# Patient Record
Sex: FEMALE | Race: WHITE | NOT HISPANIC OR LATINO
[De-identification: names, ages, dates, MRNs, and addresses within clinical notes are randomized per-mention and may not be internally consistent; named-entity substitution may affect disease eponyms.]

---

## 2018-03-15 PROBLEM — Z00.00 ENCOUNTER FOR PREVENTIVE HEALTH EXAMINATION: Status: ACTIVE | Noted: 2018-03-15

## 2018-03-23 ENCOUNTER — APPOINTMENT (OUTPATIENT)
Dept: ANTEPARTUM | Facility: CLINIC | Age: 38
End: 2018-03-23
Payer: COMMERCIAL

## 2018-03-23 ENCOUNTER — ASOB RESULT (OUTPATIENT)
Age: 38
End: 2018-03-23

## 2018-03-23 ENCOUNTER — APPOINTMENT (OUTPATIENT)
Dept: MATERNAL FETAL MEDICINE | Facility: CLINIC | Age: 38
End: 2018-03-23
Payer: COMMERCIAL

## 2018-03-23 PROCEDURE — 76801 OB US < 14 WKS SINGLE FETUS: CPT

## 2018-03-23 PROCEDURE — 36416 COLLJ CAPILLARY BLOOD SPEC: CPT

## 2018-03-23 PROCEDURE — 76813 OB US NUCHAL MEAS 1 GEST: CPT

## 2018-03-23 PROCEDURE — 99241 OFFICE CONSULTATION NEW/ESTAB PATIENT 15 MIN: CPT

## 2018-03-26 LAB
1ST TRIMESTER DATA: NORMAL
ADDENDUM DOC: NORMAL
AFP PNL SERPL: NORMAL
AFP SERPL-ACNC: NORMAL
CLINICAL BIOCHEMIST REVIEW: NORMAL
FREE BETA HCG 1ST TRIMESTER: NORMAL
Lab: NORMAL
NOTES NTD: NORMAL
NT: NORMAL
PAPP-A SERPL-ACNC: NORMAL
TRISOMY 18/3: NORMAL

## 2018-03-27 ENCOUNTER — TRANSCRIPTION ENCOUNTER (OUTPATIENT)
Age: 38
End: 2018-03-27

## 2018-05-14 ENCOUNTER — APPOINTMENT (OUTPATIENT)
Dept: ANTEPARTUM | Facility: CLINIC | Age: 38
End: 2018-05-14
Payer: COMMERCIAL

## 2018-05-14 ENCOUNTER — ASOB RESULT (OUTPATIENT)
Age: 38
End: 2018-05-14

## 2018-05-14 PROCEDURE — 76811 OB US DETAILED SNGL FETUS: CPT

## 2018-05-14 PROCEDURE — 76817 TRANSVAGINAL US OBSTETRIC: CPT

## 2018-07-11 ENCOUNTER — APPOINTMENT (OUTPATIENT)
Dept: ANTEPARTUM | Facility: CLINIC | Age: 38
End: 2018-07-11
Payer: COMMERCIAL

## 2018-07-11 ENCOUNTER — ASOB RESULT (OUTPATIENT)
Age: 38
End: 2018-07-11

## 2018-07-11 PROCEDURE — 76816 OB US FOLLOW-UP PER FETUS: CPT

## 2018-07-11 PROCEDURE — 76817 TRANSVAGINAL US OBSTETRIC: CPT

## 2018-08-22 ENCOUNTER — APPOINTMENT (OUTPATIENT)
Dept: ANTEPARTUM | Facility: CLINIC | Age: 38
End: 2018-08-22

## 2018-08-22 ENCOUNTER — APPOINTMENT (OUTPATIENT)
Dept: ANTEPARTUM | Facility: CLINIC | Age: 38
End: 2018-08-22
Payer: COMMERCIAL

## 2018-08-22 ENCOUNTER — ASOB RESULT (OUTPATIENT)
Age: 38
End: 2018-08-22

## 2018-08-22 PROCEDURE — 76816 OB US FOLLOW-UP PER FETUS: CPT

## 2018-08-22 PROCEDURE — 76819 FETAL BIOPHYS PROFIL W/O NST: CPT

## 2018-08-22 PROCEDURE — 76817 TRANSVAGINAL US OBSTETRIC: CPT

## 2018-12-12 ENCOUNTER — TRANSCRIPTION ENCOUNTER (OUTPATIENT)
Age: 38
End: 2018-12-12

## 2020-02-24 ENCOUNTER — APPOINTMENT (OUTPATIENT)
Dept: CARDIOLOGY | Facility: CLINIC | Age: 40
End: 2020-02-24
Payer: COMMERCIAL

## 2020-02-24 ENCOUNTER — NON-APPOINTMENT (OUTPATIENT)
Age: 40
End: 2020-02-24

## 2020-02-24 VITALS
DIASTOLIC BLOOD PRESSURE: 79 MMHG | SYSTOLIC BLOOD PRESSURE: 123 MMHG | BODY MASS INDEX: 24.99 KG/M2 | RESPIRATION RATE: 82 BRPM | HEIGHT: 65 IN | OXYGEN SATURATION: 98 % | WEIGHT: 150 LBS

## 2020-02-24 DIAGNOSIS — Z82.49 FAMILY HISTORY OF ISCHEMIC HEART DISEASE AND OTHER DISEASES OF THE CIRCULATORY SYSTEM: ICD-10-CM

## 2020-02-24 DIAGNOSIS — R93.1 ABNORMAL FINDINGS ON DIAGNOSTIC IMAGING OF HEART AND CORONARY CIRCULATION: ICD-10-CM

## 2020-02-24 DIAGNOSIS — N95.9 UNSPECIFIED MENOPAUSAL AND PERIMENOPAUSAL DISORDER: ICD-10-CM

## 2020-02-24 PROCEDURE — 99204 OFFICE O/P NEW MOD 45 MIN: CPT

## 2020-02-24 PROCEDURE — 93000 ELECTROCARDIOGRAM COMPLETE: CPT

## 2020-03-12 ENCOUNTER — TRANSCRIPTION ENCOUNTER (OUTPATIENT)
Age: 40
End: 2020-03-12

## 2021-01-20 ENCOUNTER — TRANSCRIPTION ENCOUNTER (OUTPATIENT)
Age: 41
End: 2021-01-20

## 2022-05-08 ENCOUNTER — NON-APPOINTMENT (OUTPATIENT)
Age: 42
End: 2022-05-08

## 2022-12-31 ENCOUNTER — NON-APPOINTMENT (OUTPATIENT)
Age: 42
End: 2022-12-31

## 2023-01-27 ENCOUNTER — NON-APPOINTMENT (OUTPATIENT)
Age: 43
End: 2023-01-27

## 2023-01-27 ENCOUNTER — APPOINTMENT (OUTPATIENT)
Dept: OTOLARYNGOLOGY | Facility: CLINIC | Age: 43
End: 2023-01-27
Payer: COMMERCIAL

## 2023-01-27 VITALS
SYSTOLIC BLOOD PRESSURE: 115 MMHG | DIASTOLIC BLOOD PRESSURE: 82 MMHG | BODY MASS INDEX: 24.99 KG/M2 | HEART RATE: 81 BPM | HEIGHT: 65 IN | WEIGHT: 150 LBS

## 2023-01-27 DIAGNOSIS — R07.0 PAIN IN THROAT: ICD-10-CM

## 2023-01-27 DIAGNOSIS — Z86.16 PERSONAL HISTORY OF COVID-19: ICD-10-CM

## 2023-01-27 DIAGNOSIS — H69.83 OTHER SPECIFIED DISORDERS OF EUSTACHIAN TUBE, BILATERAL: ICD-10-CM

## 2023-01-27 DIAGNOSIS — E04.9 NONTOXIC GOITER, UNSPECIFIED: ICD-10-CM

## 2023-01-27 PROCEDURE — 92570 ACOUSTIC IMMITANCE TESTING: CPT

## 2023-01-27 PROCEDURE — 31575 DIAGNOSTIC LARYNGOSCOPY: CPT

## 2023-01-27 PROCEDURE — 92557 COMPREHENSIVE HEARING TEST: CPT

## 2023-01-27 PROCEDURE — 99243 OFF/OP CNSLTJ NEW/EST LOW 30: CPT | Mod: 25

## 2023-01-27 RX ORDER — FLUTICASONE PROPIONATE 50 UG/1
50 SPRAY, METERED NASAL
Qty: 3 | Refills: 3 | Status: ACTIVE | COMMUNITY
Start: 2023-01-27 | End: 1900-01-01

## 2023-01-27 RX ORDER — FAMOTIDINE 20 MG/1
20 TABLET, FILM COATED ORAL
Qty: 60 | Refills: 5 | Status: ACTIVE | COMMUNITY
Start: 2023-01-27 | End: 1900-01-01

## 2023-01-27 RX ORDER — AZELASTINE HYDROCHLORIDE AND FLUTICASONE PROPIONATE 137; 50 UG/1; UG/1
137-50 SPRAY, METERED NASAL
Qty: 1 | Refills: 5 | Status: ACTIVE | COMMUNITY
Start: 2023-01-27 | End: 1900-01-01

## 2023-01-27 RX ORDER — AZELASTINE HYDROCHLORIDE 137 UG/1
137 SPRAY, METERED NASAL TWICE DAILY
Qty: 3 | Refills: 3 | Status: ACTIVE | COMMUNITY
Start: 2023-01-27 | End: 1900-01-01

## 2023-01-27 NOTE — REVIEW OF SYSTEMS
[Post Nasal Drip] : post nasal drip [Ear Pain] : ear pain [Ear Itch] : ear itch [Hearing Loss] : hearing loss [Dizziness] : dizziness [Vertigo] : vertigo [Lightheadedness] : lightheadedness [Ear Drainage] : ear drainage [Ear Noises] : ear noises [Nasal Congestion] : nasal congestion [Sinus Pain] : sinus pain [Sinus Pressure] : sinus pressure [Hoarseness] : hoarseness [Throat Clearing] : throat clearing [Throat Pain] : throat pain [Throat Dryness] : throat dryness [Throat Itching] : throat itching [Palpitations] : palpitations [Cough] : cough [Swollen Glands] : swollen glands [Negative] : Endocrine [FreeTextEntry1] : fatigue, headaches, difficulty swallowing, daytime sleepiness

## 2023-01-27 NOTE — HISTORY OF PRESENT ILLNESS
[de-identified] : Patient states that about a month ago she starting having throat pain described as glass in her throat. Patient states her  had Covid when her symptoms first began. Patient states she eventually tested positive for Covid. Patient states her symptoms eventually progressed to nasal congestion, mucus in her throat, and pain and pressure in her left ear. She also had strep test which was negative. She states most of her throat pain comes at night time and that she cant get enough water. She adds that her voice doesn’t sound like normally does. Patient states she has a clogged sensation in her left ear.  Patient adds that she does get indigestion and reflux. She states she has been trying to watch her coffee intake.

## 2023-01-27 NOTE — CONSULT LETTER
[Dear  ___] : Dear  [unfilled], [Consult Letter:] : I had the pleasure of evaluating your patient, [unfilled]. [Please see my note below.] : Please see my note below. [Consult Closing:] : Thank you very much for allowing me to participate in the care of this patient.  If you have any questions, please do not hesitate to contact me. [Sincerely,] : Sincerely, [FreeTextEntry1] : Mark Anthony Howard MD FACS

## 2023-01-27 NOTE — ASSESSMENT
[FreeTextEntry1] : Reviewed and reconciled medications, allergies, PMHx, PSHx, SocHx, FMHx \par \par Patient states that about a month ago she starting having throat pain described as glass in her throat. Patient states her  had Covid when her symptoms first began. Patient states she eventually tested positive for Covid. Patient states her symptoms eventually progressed to nasal congestion, mucus in her throat, and pain and pressure in her left ear. She also had strep test which was negative. She states most of her throat pain comes at night time and that she cant get enough water. She adds that her voice doesn’t sound like normally does. Patient adds that she does get indigestion and reflux. She states she has been trying to watch her coffee intake. \par \par Physical Exam:\par -Left Ear: dull\par -romberg negative - felt unsteady\par -horizontal head roll: positive. better with eyes open\par -vertical head roll: negative\par -slight deviation of septum to the left\par -tonsils are small and cryptic, class 1\par \par Flexible Laryngoscopy:\par -nasal pharynx minimally inflamed\par -BOT normal\par -mild edema of arytenoid and intra- arytenoid\par -no lesions, pooling. or growth\par \par Audio: negative pressure both ears:  - 4 on the right and -5 on the left\par -100% discrim at 45dB bilaterally.\par TYMPS:  TYPE A, AU\par ETF: ABNORMAL, AU\par HEARING -8KHZ, AU \par \par \par Plan: Flexible laryngoscopy. Reflux diet/instruction sheet provided. Audio - results interpreted by Dr. Howard and reviewed with the patient. Start Dymista - 1 spray bilaterally BID, spray laterally. VNG ordered. Thyroid blood work ordered. Sed rate and ANNIKA ordered. Start Famotidine twice a day - one in the morning before breakfast, and one at bedtime. FEEST ordered and to be done at least 2 weeks after starting the medication. FU after tests

## 2023-01-27 NOTE — PHYSICAL EXAM
[Hearing Bee Test (Tuning Fork On Forehead)] : no lateralization of tone [Midline] : trachea located in midline position [Normal] : no rashes [de-identified] : tender over the thyroid [FreeTextEntry9] : dull [FreeTextEntry1] : -slight deviation of septum to the left [de-identified] : tonsils are small and cryptic, class 1 [] : Romberg test is negative [de-identified] : felt unsteady

## 2023-01-27 NOTE — PROCEDURE
[Hoarseness] : hoarseness not clearly evaluated by indirect laryngoscopy [Complicated Symptoms] : complicated symptoms requiring more thorough examination than provided by mirror [de-identified] : Flexible Laryngoscopy:\par -nasal pharynx minimally inflamed\par -BOT normal\par -mild edema of arytenoid and intra- arytenoid\par -no lesions, pooling. or growth\par

## 2023-01-30 LAB
ERYTHROCYTE [SEDIMENTATION RATE] IN BLOOD BY WESTERGREN METHOD: 3 MM/HR
T3FREE SERPL-MCNC: 2.37 PG/ML
T4 FREE SERPL-MCNC: 1.3 NG/DL
THYROGLOB AB SERPL-ACNC: <20 IU/ML
THYROPEROXIDASE AB SERPL IA-ACNC: 12.8 IU/ML
TSH SERPL-ACNC: 0.77 UIU/ML

## 2023-01-31 ENCOUNTER — TRANSCRIPTION ENCOUNTER (OUTPATIENT)
Age: 43
End: 2023-01-31

## 2023-02-01 LAB — ANA SER IF-ACNC: NEGATIVE

## 2023-02-08 ENCOUNTER — APPOINTMENT (OUTPATIENT)
Dept: OTOLARYNGOLOGY | Facility: CLINIC | Age: 43
End: 2023-02-08
Payer: COMMERCIAL

## 2023-02-08 PROCEDURE — 92537 CALORIC VSTBLR TEST W/REC: CPT

## 2023-02-08 PROCEDURE — 92540 BASIC VESTIBULAR EVALUATION: CPT

## 2023-02-13 ENCOUNTER — APPOINTMENT (OUTPATIENT)
Dept: OTOLARYNGOLOGY | Facility: CLINIC | Age: 43
End: 2023-02-13
Payer: COMMERCIAL

## 2023-02-13 PROCEDURE — 92612 ENDOSCOPY SWALLOW (FEES) VID: CPT | Mod: GN

## 2023-02-14 NOTE — ASSESSMENT
[FreeTextEntry1] : REPORT OF EVALUATION OF SWALLOW FUNCTION VIA FLEXIBLE ENDOSCOPIC EXAMINATION OF SWALLOWING (FEES) \par \par Date of Evaluation: 23\par Patient Name: Sinai López \par : 1980\par Diagnosis: Pharyngeal Dysphagia R13.14\par Physician: Dr. Mark Anthony Howard\par Type of Assessment: FEES\par Onset: 2022\par \par History: Information on this patient has been provided by the patient and via chart review. Ms. Sinai López is a 42 year-old-female who was referred for a FEES. The purpose of the evaluation is to clinically assess the oropharyngeal swallow mechanism and vocal mechanism, secondary to concerns for excessive throat clearing and intermittent dysphagia.  \par \par Reason For Referral: Patient reports a ~2 month history of odynophagia, excessive throat clearing, dysphagia and hoarseness. She stated she had COVID-19 infection in December which preceded her symptoms. She reported a history of LUIS but has been trying to adhere to a LPRD/LUIS diet. She reported she has extensive voice demands for her job as a  and has 2 young children. 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 Pureed via teaspoon\par 2. Solids, self-fed\par 3. Thin fluids via cup\par \par FEES PROCEDURE: \par For the purposes of today’s assessment, the patient was provided with pureed, regular solid and thin liquid textures impregnated in green. The nasendoscope was advanced via the left nare and placed superiorly to the supraglottic structures, with good visibility. The supraglottic structures appeared to be WNL. The vocal folds were fully mobile bilaterally. There was edema of the postcricoid, arytenoids and interarytenoid structures. There were white, ropey secretions along the epiglottic rim prior to PO trials. \par \par Oral stages were WFL marked by adequate acceptance of bolus, lip closure and oral containment, with adequate lingual movement and cohesive bolus formation, recollection, and AP transport. Intermittent premature spillage the valleculae noted across solids. \par \par Pharyngeal stage was marked by a mild swallow trigger delay and mildly reduced base of tongue retraction and adequate pharyngeal contractibility. There was complete laryngeal vesituble closure. There was trace-mild residue viewed in valleculae and the epiglottic rim post swallow of pureed and solids, reduced in amount for thin fluids which was reduced in amount with secondary/tertiary swallows. There was no penetration or aspiration pre or post swallow for puree, solids, and thin fluids. In addition, evidence of LPR included return of green-tinged material to the level of the pharyngoesophageal segment advancing to the pyriform sinuses. At this point, the scope was carefully removed and testing was discontinued, with the patient tolerating the procedure without difficulty. \par \par Aspiration - Penetration Scale: 1 - Pureed; Regular Solids; Thin Liquid\par \par Aspiration - Penetration Scale (Bibik et al Dysphagia 11:93-98 (1996), Aspiration-Penetration Scale) \par 1. Material does not enter the airway \par 2. Material enters the airway, remains above the vocal folds, and is ejected from the airway \par 3. Material enters the airway, remains above the vocal folds, and is not ejected \par 4. Material enters the airway, contacts the vocal folds, and is ejected from the airway \par 5. Material enters the airway, contacts the vocal folds, and is not ejected from the airway \par 6. Material enters the airway, passes below the vocal folds and is ejected into the larynx or out of the airway \par 7. Material enters the airway, passes below the vocal folds, and is not ejected from the trachea despite effort \par 8. Material enters the airway, passes below the vocal folds, and no effort is made to eject \par \par IMPRESSIONS: Mild pharyngeal Dysphagia with evidence of active laryngopharyngeal reflux \par \par RECOMMENDATIONS:\par 1) Continue a Regular Solid / Thin Liquid diet\par 2) Initiate Reflux Precautions and Lifestyle / Diet Modifications\par 3) Strict adherence to vocal hygiene parameters (i.e., increase hydration, avoid vocal abuse/misuse, avoid aggressive throat clearing/coughing, avoid environmental irritants, etc.).\par 4) Follow up with referring MD as directed\par \par EDUCATION: Verbal and written educational information were provided re: reflux precautions and lifestyle / diet modifications. The patient demonstrated full understanding for all the above. Should you have additional questions/concerns, please contact this office at [phone].\par \par Maya Landis M.A., CCC-SLP\par Speech-Language Pathologist

## 2023-02-22 ENCOUNTER — APPOINTMENT (OUTPATIENT)
Dept: OTOLARYNGOLOGY | Facility: CLINIC | Age: 43
End: 2023-02-22
Payer: COMMERCIAL

## 2023-02-22 VITALS
HEART RATE: 82 BPM | WEIGHT: 150 LBS | DIASTOLIC BLOOD PRESSURE: 79 MMHG | SYSTOLIC BLOOD PRESSURE: 116 MMHG | HEIGHT: 65 IN | BODY MASS INDEX: 24.99 KG/M2

## 2023-02-22 DIAGNOSIS — J38.4 EDEMA OF LARYNX: ICD-10-CM

## 2023-02-22 DIAGNOSIS — H93.3X2 DISORDERS OF LEFT ACOUSTIC NERVE: ICD-10-CM

## 2023-02-22 DIAGNOSIS — R13.12 DYSPHAGIA, OROPHARYNGEAL PHASE: ICD-10-CM

## 2023-02-22 PROCEDURE — 99214 OFFICE O/P EST MOD 30 MIN: CPT

## 2023-02-22 RX ORDER — OMEPRAZOLE 40 MG/1
40 CAPSULE, DELAYED RELEASE ORAL
Qty: 90 | Refills: 3 | Status: ACTIVE | COMMUNITY
Start: 2023-02-22 | End: 1900-01-01

## 2023-02-22 NOTE — ADDENDUM
[FreeTextEntry1] : Documented by Agnes Bhakta acting as scribe for Dr. Howard on 02/22/2023.\par \par All Medical record entries made by the scribe were at my, Dr. Howard,direction and personally dictated by me on 02/22/2023. I have reviewed the chart and agree that the record accurately reflects my personal performance of the history, physical exam, assessment and plan. I have also personally directed, reviewed, and agreed with the discharge instructions.

## 2023-02-22 NOTE — ASSESSMENT
[FreeTextEntry1] : Reviewed and reconciled medications, allergies, PMHx, PSHx, SocHx, FMHx.\par \par Pt presents with h/o dizziness and throat pain. Pt presents today for VNG results. Pt notes she still gets dizzy occasionally. Pt notes she notices it often when she is making dinner and cutting vegetables. Pt notes she gets dizzy when she uses a computer and when she turns quick when driving. Pt notes she has been following reflux diet and taking Famotidine, but she still gets reflux symptoms. \par \par Audio 1/27/23 normal\par \par VNG 2/8/23\par normal\par 52% weakness in left ear \par \par FEEST 2/13/23\par edema postcricoid, thick secretions in the throat, trace residue in vallecula epiglottic rim - resolved with second swallow, evidence of active reflux\par \par Plan:\par Discussed VNG and FEEST. Vestibular therapy recommended. More the 50% of time was spent counseling the patient. Take smaller bites, drink as you eat, avoid laying down after eating, continue to follow reflux diet. Omeprazole QAM 30 minutes after other medications and 30 minutes before morning meal and Famotidine QPM. Continue Azelastine and Flonase. FU 3 months

## 2023-02-22 NOTE — CONSULT LETTER
[Dear  ___] : Dear  [unfilled], [Courtesy Letter:] : I had the pleasure of seeing your patient, [unfilled], in my office today. [Please see my note below.] : Please see my note below. [Consult Closing:] : Thank you very much for allowing me to participate in the care of this patient.  If you have any questions, please do not hesitate to contact me. [Sincerely,] : Sincerely, [FreeTextEntry3] : Mark Anthony Howard MD FACS

## 2023-02-22 NOTE — HISTORY OF PRESENT ILLNESS
[de-identified] : Pt presents with h/o dizziness and throat pain. Pt presents today for VNG results. Pt notes she still gets dizzy occasionally. Pt notes she notices it often when she is making dinner and cutting vegetables. Pt notes she gets dizzy when she uses a computer and when she turns quick when driving. Pt denies dizziness when laying in bed, turning over in bed, turning her head while just sitting here. pt notes she feels a difference on the left side compared to the right side (left worse than right). Pt notes she has been following the reflux diet and taking Famotidine, but she still feels burning in her stomach and mucus in throat.

## 2023-03-08 ENCOUNTER — APPOINTMENT (OUTPATIENT)
Dept: OTOLARYNGOLOGY | Facility: CLINIC | Age: 43
End: 2023-03-08

## 2023-05-22 ENCOUNTER — APPOINTMENT (OUTPATIENT)
Dept: OTOLARYNGOLOGY | Facility: CLINIC | Age: 43
End: 2023-05-22

## 2023-05-31 ENCOUNTER — APPOINTMENT (OUTPATIENT)
Dept: PEDIATRIC ALLERGY IMMUNOLOGY | Facility: CLINIC | Age: 43
End: 2023-05-31
Payer: COMMERCIAL

## 2023-05-31 VITALS
HEART RATE: 84 BPM | WEIGHT: 150 LBS | HEIGHT: 65 IN | OXYGEN SATURATION: 99 % | BODY MASS INDEX: 24.99 KG/M2 | TEMPERATURE: 98 F | SYSTOLIC BLOOD PRESSURE: 120 MMHG | DIASTOLIC BLOOD PRESSURE: 79 MMHG

## 2023-05-31 DIAGNOSIS — K21.9 GASTRO-ESOPHAGEAL REFLUX DISEASE W/OUT ESOPHAGITIS: ICD-10-CM

## 2023-05-31 DIAGNOSIS — R10.9 UNSPECIFIED ABDOMINAL PAIN: ICD-10-CM

## 2023-05-31 DIAGNOSIS — L29.9 PRURITUS, UNSPECIFIED: ICD-10-CM

## 2023-05-31 DIAGNOSIS — R21 RASH AND OTHER NONSPECIFIC SKIN ERUPTION: ICD-10-CM

## 2023-05-31 DIAGNOSIS — R42 DIZZINESS AND GIDDINESS: ICD-10-CM

## 2023-05-31 DIAGNOSIS — J30.9 ALLERGIC RHINITIS, UNSPECIFIED: ICD-10-CM

## 2023-05-31 DIAGNOSIS — L25.9 UNSPECIFIED CONTACT DERMATITIS, UNSPECIFIED CAUSE: ICD-10-CM

## 2023-05-31 PROCEDURE — 95004 PERQ TESTS W/ALRGNC XTRCS: CPT

## 2023-05-31 PROCEDURE — 99204 OFFICE O/P NEW MOD 45 MIN: CPT | Mod: 25

## 2023-05-31 NOTE — IMPRESSION
[Allergy Testing Dust Mite] : dust mites [Allergy Testing Mixed Feathers] : feathers [Allergy Testing Cockroach] : cockroach [Allergy Testing Dog] : dog [Allergy Testing Cat] : cat [Allergy Testing Trees] : trees [Allergy Testing Weeds] : weeds [Allergy Testing Grasses] : grasses [_____] : fish ([unfilled]) [] : garlic [________] : [unfilled]

## 2023-05-31 NOTE — SOCIAL HISTORY
[de-identified] : House with oil forced air heating, central air conditioning, no carpet in the bedroom (has area rug), hermit crab, no severe smoke exposure.  She never smoked.  She works as an  for high school, doing many crafts.

## 2023-05-31 NOTE — PHYSICAL EXAM
[Alert] : alert [No Acute Distress] : no acute distress [Normal Voice/Communication] : normal voice communication [Supple] : the neck was supple [Normal Cervical Lymph Nodes] : cervical [Skin Intact] : skin intact  [Urticaria] : no urticaria [Dermatographism] : no dermatographism [No clubbing] : no clubbing [No Cyanosis] : no cyanosis [Alert, Awake, Oriented as Age-Appropriate] : alert, awake, oriented as age appropriate [de-identified] : Eyes clear. [de-identified] : Throat clear.  Nasal mucosa pink, mild stuffy nose, no discharge.  Tympanic membranes normal.  No sinus tenderness. [de-identified] : Chest clear, good air entry, no wheezing or crackles. [de-identified] : S1-S2 regular, no murmurs. [de-identified] : DiffuseAbdomen soft, tenderness, no organomegaly. [de-identified] : No rash on hands.  Few small acne lesions on the neck.

## 2023-05-31 NOTE — REVIEW OF SYSTEMS
[Post Nasal Drip] : post nasal drip [Difficulty Breathing] : no dyspnea [Cough] : cough [Heartburn] : heartburn [Abdominal Pain] : abdominal pain [Pruritus] : pruritus [Recurrent Sinus Infections] : no recurrent sinus infections [Recurrent Throat Infections] : no recurrence of throat infections [Recurrent Bronchitis] : no recurrent bronchitis [Recurrent Ear Infections] : no recurrence or ear infections [Recurrent Skin Infections] : no recurrent skin infections [Recurrent Pneumonia] : no ~T recurrent pneumonia

## 2023-05-31 NOTE — ASSESSMENT
[FreeTextEntry1] : Pruritic rashes: Hand rashes consistent with dyshidrotic eczema.  Measures to decrease symptoms discussed and written information on hand eczema was given.  May use triamcinolone cream, which she has at home, for flares.  Skin testing to foods was negative.  IgE to certain foods was requested.\par Itching with exercise: Requested serum tryptase.  Runs in the morning.  Take antihistamines (Allegra 180 mg) the night before and assess benefit.\par History of contact dermatitis to Band-Aids.  Avoid exposure to glues.\par Facial rash: Avoid using triamcinolone, because the rash resembles slight acne.\par Allergic rhinitis: Skin testing to environmental allergens negative.  Confirmed with blood work.  \par Dizziness: Follow-up with ENT and neurologist.  Most likely not related to allergies.\par Abdominal pain/heartburn: Pain seems to be in the upper abdomen.  Avoid peppermint oil, which may contribute to the reflux.  Blood work for celiac panel requested, to identify gluten intolerance.\par Call with results of blood work.

## 2023-05-31 NOTE — HISTORY OF PRESENT ILLNESS
[de-identified] : In office to be evaluated for rashes and the gastrointestinal symptoms, possibly food allergies.  Symptoms of several years, consisting of on and off itchy rashes with small blisters on her hands, increased from handling all products, acidic foods, raw vegetables and meats, and maged skin.  Tolerates eating maged.  Also gets itchy on her legs with running.\par Symptoms for 7 years, after childbirth, consisting of dizziness on and off increased with quick eye movement.  ENT work-up diagnosed acid reflux among others.  Upper endoscopy diagnosed with chronic gastritis and excessive air bubbles in the stomach.  No blood work was done before endoscopy.  She eats fast, having young children.  Used to have occasional heartburn from tomato sauce and coffee, but gastrointestinal symptoms increased after COVID-19 infection a few months ago.  She was given initially omeprazole, that increase the gastrointestinal symptoms.  She was switched to pantoprazole and Carafate.  She was also prescribed IBgard (Jordan and peppermint oil), and following these medications she noticed rashes on her face that were itchy at times.  She stopped the IBgard, but she still gets on and off rashes.  She applies triamcinolone cream, that she received from the dermatologist for a blistery rash at the site of a Band-Aid.  Gets blistery rashes from Band-Aids.  She now takes peppermint oil occasionally and eats jordan seeds occasionally, which seem tolerated.  She gets abdominal pain with chickpeas, beans, broccoli, cabbage, cheese, and dairy.  Eggplant and peppers may cause occasional hives.  The burning sensation in her stomach is in the epigastric area and may also manifest as heartburn, and the symptoms persist in spite of treatment.\par Allergies: Ear pain and migraine headaches from cleaning products, cigarette smoke, and significant dust exposure.  Gets postnasal drip and cough especially in the winter.  She does not have chest symptoms or frequent antibiotics for respiratory infections.  She has no known medication allergies.\par She is known with mitral valve prolapse.

## 2023-06-19 ENCOUNTER — NON-APPOINTMENT (OUTPATIENT)
Age: 43
End: 2023-06-19

## 2023-06-29 ENCOUNTER — NON-APPOINTMENT (OUTPATIENT)
Age: 43
End: 2023-06-29

## 2023-11-12 ENCOUNTER — NON-APPOINTMENT (OUTPATIENT)
Age: 43
End: 2023-11-12